# Patient Record
Sex: FEMALE | Race: WHITE | URBAN - METROPOLITAN AREA
[De-identification: names, ages, dates, MRNs, and addresses within clinical notes are randomized per-mention and may not be internally consistent; named-entity substitution may affect disease eponyms.]

---

## 2022-12-09 ENCOUNTER — APPOINTMENT (OUTPATIENT)
Dept: LAB | Age: 47
End: 2022-12-09

## 2022-12-09 ENCOUNTER — APPOINTMENT (OUTPATIENT)
Dept: URGENT CARE | Age: 47
End: 2022-12-09

## 2022-12-09 DIAGNOSIS — Z00.00 PHYSICAL EXAM: ICD-10-CM

## 2022-12-11 LAB
GAMMA INTERFERON BACKGROUND BLD IA-ACNC: 0.03 IU/ML
M TB IFN-G BLD-IMP: NEGATIVE
M TB IFN-G CD4+ BCKGRND COR BLD-ACNC: 0 IU/ML
M TB IFN-G CD4+ BCKGRND COR BLD-ACNC: 0 IU/ML
MITOGEN IGNF BCKGRD COR BLD-ACNC: >10 IU/ML

## 2023-05-11 ENCOUNTER — APPOINTMENT (OUTPATIENT)
Dept: LAB | Facility: CLINIC | Age: 48
End: 2023-05-11

## 2023-05-11 DIAGNOSIS — Z02.1 PRE-EMPLOYMENT HEALTH SCREENING EXAMINATION: ICD-10-CM

## 2023-05-11 LAB
MEV IGG SER QL IA: NORMAL
MUV IGG SER QL IA: NORMAL
RUBV IGG SERPL IA-ACNC: 16.4 IU/ML
VZV IGG SER QL IA: NORMAL

## 2023-05-14 LAB
GAMMA INTERFERON BACKGROUND BLD IA-ACNC: 0.03 IU/ML
M TB IFN-G BLD-IMP: NEGATIVE
M TB IFN-G CD4+ BCKGRND COR BLD-ACNC: -0.01 IU/ML
M TB IFN-G CD4+ BCKGRND COR BLD-ACNC: -0.02 IU/ML
MITOGEN IGNF BCKGRD COR BLD-ACNC: >10 IU/ML

## 2023-09-28 ENCOUNTER — OFFICE VISIT (OUTPATIENT)
Dept: BARIATRICS | Facility: CLINIC | Age: 48
End: 2023-09-28
Payer: COMMERCIAL

## 2023-09-28 VITALS
BODY MASS INDEX: 45.99 KG/M2 | HEIGHT: 67 IN | DIASTOLIC BLOOD PRESSURE: 98 MMHG | SYSTOLIC BLOOD PRESSURE: 164 MMHG | WEIGHT: 293 LBS | HEART RATE: 102 BPM

## 2023-09-28 DIAGNOSIS — Z13.1 SCREENING FOR DIABETES MELLITUS: ICD-10-CM

## 2023-09-28 DIAGNOSIS — E55.9 VITAMIN D DEFICIENCY: ICD-10-CM

## 2023-09-28 DIAGNOSIS — E66.01 CLASS 3 SEVERE OBESITY DUE TO EXCESS CALORIES WITH SERIOUS COMORBIDITY AND BODY MASS INDEX (BMI) OF 45.0 TO 49.9 IN ADULT (HCC): Primary | ICD-10-CM

## 2023-09-28 DIAGNOSIS — Z13.220 SCREENING CHOLESTEROL LEVEL: ICD-10-CM

## 2023-09-28 PROBLEM — E66.813 CLASS 3 SEVERE OBESITY DUE TO EXCESS CALORIES WITH SERIOUS COMORBIDITY AND BODY MASS INDEX (BMI) OF 45.0 TO 49.9 IN ADULT (HCC): Status: ACTIVE | Noted: 2023-09-28

## 2023-09-28 PROCEDURE — 99204 OFFICE O/P NEW MOD 45 MIN: CPT | Performed by: NURSE PRACTITIONER

## 2023-09-28 NOTE — ASSESSMENT & PLAN NOTE
- Discussed options of HealthyCORE-Intensive Lifestyle Intervention Program, Very Low Calorie Diet-VLCD, Conservative Program, Antony-En-Y Gastric Bypass and Vertical Sleeve Gastrectomy and the role of weight loss medications. Patient is not interested in bariatric surgery. - Explained the importance of making lifestyle changes first before starting anti-obesity medications. - Patient should demonstrate lifestyle changes first before anti-obesity medication initiated. - Patient is interested in pursuing HealthyCORE-Intensive Lifestyle Intervention Program and follow up visits with medical weight management provider. - Initial weight loss goal of 5-10% weight loss for improved health  - Weight loss can improve patient's co-morbid conditions and/or prevent weight-related complications. - Lab work ordered today including CBC, CMP, thyroid, lipids, A1c, fasting insulin, vitamin D  - patient lifestyle habits were reviewed and barriers to weight loss were identified today. Patient will be participating in healthy core program to get a solid foundation for healthy lifestyle. Patient will work with dietitian to better balance her calories throughout the day and to work on portion control. Patient was encouraged to get a more solid exercise plan in place included dedicated 30 minutes of activity at least 3 days a week. Medications were discussed but patient would like to avoid medication at this time    Goals:  Do not skip meals. Food log via neeta - options include  www. [email or web address].[email or web address], sparkpeople. com, [email or web address] [email or web address], SideStep. com, baritastic  No sugary beverages. At least 64oz of water daily. Increase physical activity by 10 minutes daily.  Gradually increase physical activity to a goal of 5 days per week for 30 minutes of MODERATE intensity PLUS 2 days per week of FULL BODY resistance training

## 2023-09-28 NOTE — PROGRESS NOTES
Assessment/Plan:    Class 3 severe obesity due to excess calories with serious comorbidity and body mass index (BMI) of 45.0 to 49.9 in adult [facility])  - Discussed options of HealthyCORE-Intensive Lifestyle Intervention Program, Very Low Calorie Diet-VLCD, Conservative Program, Antony-En-Y Gastric Bypass and Vertical Sleeve Gastrectomy and the role of weight loss medications. Patient is not interested in bariatric surgery. - Explained the importance of making lifestyle changes first before starting anti-obesity medications. - Patient should demonstrate lifestyle changes first before anti-obesity medication initiated. - Patient is interested in pursuing HealthyCORE-Intensive Lifestyle Intervention Program and follow up visits with medical weight management provider. - Initial weight loss goal of 5-10% weight loss for improved health  - Weight loss can improve patient's co-morbid conditions and/or prevent weight-related complications. - Lab work ordered today including CBC, CMP, thyroid, lipids, A1c, fasting insulin, vitamin D  - patient lifestyle habits were reviewed and barriers to weight loss were identified today. Patient will be participating in healthy core program to get a solid foundation for healthy lifestyle. Patient will work with dietitian to better balance her calories throughout the day and to work on portion control. Patient was encouraged to get a more solid exercise plan in place included dedicated 30 minutes of activity at least 3 days a week. Medications were discussed but patient would like to avoid medication at this time    Goals:  Do not skip meals. Food log via neeta - options include  www. [email or web address] [email or web address], sparkpeople. com, HZOit.com, Per[employer] Technologiesking. com, baritastic  No sugary beverages. At least 64oz of water daily. Increase physical activity by 10 minutes daily.  Gradually increase physical activity to a goal of 5 days per week for 30 minutes of MODERATE intensity PLUS 2 days per week of FULL BODY resistance training         Rachael Jason was seen today for consult. Diagnoses and all orders for this visit:    Class 3 severe obesity due to excess calories with serious comorbidity and body mass index (BMI) of 45.0 to 49.9 in adult (HCC)  -     CBC and differential; Future  -     Comprehensive metabolic panel; Future  -     TSH, 3rd generation with Free T4 reflex; Future  -     Vitamin D 25 hydroxy; Future  -     Lipid panel; Future  -     Insulin, fasting; Future  -     HEMOGLOBIN A1C W/ EAG ESTIMATION; Future    Vitamin D deficiency  -     Vitamin D 25 hydroxy; Future    Screening for diabetes mellitus  -     Insulin, fasting; Future  -     HEMOGLOBIN A1C W/ EAG ESTIMATION; Future    Screening cholesterol level  -     Lipid panel; Future           Total time spent reviewing chart, interviewing patient, examining patient, discussing plan, answering all questions, and documentin min, with >50% face-to-face time spent counseling patient on nonsurgical interventions for the treatment of excess weight. Discussed in detail nonsurgical options including intensive lifestyle intervention program, very low-calorie diet program and conservative program.  Discussed the role of weight loss medications. Counseled patient on diet behavior and exercise modification for weight loss. Follow up in approximately 2 months with Non-Surgical Physician/Advanced Practitioner. Subjective:   Chief Complaint   Patient presents with   • Consult     Pt is here today for MWM consult. Patient ID: Genevieve Ornelas  is a 49 y.o. female with excess weight/obesity here to pursue weight management. Previous notes and records have been reviewed. History reviewed. No pertinent past medical history.   Past Surgical History:   Procedure Laterality Date   • HERNIA REPAIR     • KNEE ARTHROSCOPY W/ ACL RECONSTRUCTION Left    • LITHOTRIPSY     • WISDOM TOOTH EXTRACTION Bilateral        HPI:  Wt Readings from Last 20 Encounters:   23 133 kg (294 lb)       Patient presents today to medical weight management office for consult. Patient has always been an active person participating in many sports and dancing well into her 35s. She became less active when she tore her ACL and has now been struggling to keep her weight under control. She has tried weight watchers in the past but felt that if she was not losing weight she was judged in school did, which did not motivate her to continue with the program.  Patient has not had lab work in quite a few years. Patient does have a history of kidney stones. 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  Patient is not interested in weight loss surgery and is not sure she wants to start weight loss medications at this time. Obesity/Excess Weight:  Severity: Severe  Onset:  15+ years    Modifiers: Diet and Exercise and Commercial Weight Loss Programs-ie.  Weight Watchers, Mar Marten, Nutrisystem, etc.  Contributing factors: Poor Food Choices, Insufficient Physical Activity, Stress/Emotional Eating, Lack of knowledge of appropriate lifestyle changes and Insufficient time to make appropriate lifestyle changes  Associated symptoms: fatigue, increased joint pain, decreased exercise capacity, body image issues, decreased self esteem, increased shortness of breath, decreased mobility, inability to do certain activities and clothes do not fit    Diet recall:  B: skips  S: (10 am) granola bar  L: (1pm) sandwich (cold cuts)   S: pretzels OR fruit  D: (7-9pm) steak/chicken with vegetables some starches (rice)  S: no    Hydration: water - 6 glasses , unsweetened iced tea, recently cut out soda  Alcohol: occasionally  Smoking: no  Exercise: walking  Occupation: phlebotomist  Sleep: well  STOP ban/8    Current weight: 294 lbs  Goal weight: 170 lbs    Colonoscopy: due  Mammogram: 2022    The following portions of the patient's history were reviewed and updated as appropriate: allergies, current medications, past family history, past medical history, past social history, past surgical history, and problem list.    Family History   Problem Relation Age of Onset   • Cancer Mother    • Breast cancer Mother    • Lung cancer Mother    • COPD Father    • Multiple sclerosis Sister    • Hemochromatosis Brother         Review of Systems   Constitutional: Negative for fatigue. HENT: Negative for sore throat. Respiratory: Negative for cough and shortness of breath. Cardiovascular: Negative for chest pain, palpitations and leg swelling. Gastrointestinal: Negative for abdominal pain, constipation, diarrhea and nausea. Genitourinary: Negative for dysuria. Musculoskeletal: Positive for arthralgias. Negative for back pain. Skin: Negative for rash. Neurological: Positive for headaches (hormonal). Psychiatric/Behavioral: Negative for dysphoric mood. The patient is not nervous/anxious. Objective:  /98 (BP Location: Left arm, Patient Position: Sitting, Cuff Size: Large)   Pulse 102   Ht 5' 7.13" (1.705 m)   Wt 133 kg (294 lb)   LMP 09/18/2023 (Exact Date)   BMI 45.87 kg/m²     Physical Exam  Vitals and nursing note reviewed. Constitutional:       Appearance: Normal appearance. She is obese. HENT:      Head: Normocephalic. Pulmonary:      Effort: Pulmonary effort is normal.   Neurological:      General: No focal deficit present. Mental Status: She is alert and oriented to person, place, and time. Psychiatric:         Mood and Affect: Mood normal.         Behavior: Behavior normal.         Thought Content: Thought content normal.         Judgment: Judgment normal.                Labs and Imaging  Recent labs and imaging have been personally reviewed.   No results found for: "WBC", "HGB", "HCT", "MCV", "PLT"  No results found for: "NA", "SODIUM", "K", "CL", "CO2", "ANIONGAP", "AGAP", "BUN", "CREATININE", "GLUC", "GLUF", "CALCIUM", "AST", "ALT", "ALKPHOS", "PROT", "TP", "BILITOT", "TBILI", "EGFR"  No results found for: "HGBA1C"  No results found for: "[id number]", "TSH"  No results found for: "CHOLESTEROL"  No results found for: "HDL"  No results found for: "TRIG"  No results found for: "LDLCALC"

## 2023-10-10 ENCOUNTER — APPOINTMENT (OUTPATIENT)
Dept: LAB | Facility: CLINIC | Age: 48
End: 2023-10-10
Payer: COMMERCIAL

## 2023-10-10 DIAGNOSIS — Z13.220 SCREENING CHOLESTEROL LEVEL: ICD-10-CM

## 2023-10-10 DIAGNOSIS — E66.01 CLASS 3 SEVERE OBESITY DUE TO EXCESS CALORIES WITH SERIOUS COMORBIDITY AND BODY MASS INDEX (BMI) OF 45.0 TO 49.9 IN ADULT (HCC): ICD-10-CM

## 2023-10-10 DIAGNOSIS — Z13.1 SCREENING FOR DIABETES MELLITUS: ICD-10-CM

## 2023-10-10 DIAGNOSIS — E55.9 VITAMIN D DEFICIENCY: ICD-10-CM

## 2023-10-10 LAB
25(OH)D3 SERPL-MCNC: 16.5 NG/ML (ref 30–100)
ALBUMIN SERPL BCP-MCNC: 4.4 G/DL (ref 3.5–5)
ALP SERPL-CCNC: 79 U/L (ref 34–104)
ALT SERPL W P-5'-P-CCNC: 68 U/L (ref 7–52)
ANION GAP SERPL CALCULATED.3IONS-SCNC: 9 MMOL/L
AST SERPL W P-5'-P-CCNC: 60 U/L (ref 13–39)
BASOPHILS # BLD AUTO: 0.03 THOUSANDS/ÂΜL (ref 0–0.1)
BASOPHILS NFR BLD AUTO: 0 % (ref 0–1)
BILIRUB SERPL-MCNC: 1.47 MG/DL (ref 0.2–1)
BUN SERPL-MCNC: 13 MG/DL (ref 5–25)
CALCIUM SERPL-MCNC: 9.4 MG/DL (ref 8.4–10.2)
CHLORIDE SERPL-SCNC: 100 MMOL/L (ref 96–108)
CHOLEST SERPL-MCNC: 202 MG/DL
CO2 SERPL-SCNC: 28 MMOL/L (ref 21–32)
CREAT SERPL-MCNC: 0.62 MG/DL (ref 0.6–1.3)
EOSINOPHIL # BLD AUTO: 0.15 THOUSAND/ÂΜL (ref 0–0.61)
EOSINOPHIL NFR BLD AUTO: 2 % (ref 0–6)
ERYTHROCYTE [DISTWIDTH] IN BLOOD BY AUTOMATED COUNT: 13.1 % (ref 11.6–15.1)
EST. AVERAGE GLUCOSE BLD GHB EST-MCNC: 131 MG/DL
GFR SERPL CREATININE-BSD FRML MDRD: 106 ML/MIN/1.73SQ M
GLUCOSE P FAST SERPL-MCNC: 87 MG/DL (ref 65–99)
HBA1C MFR BLD: 6.2 %
HCT VFR BLD AUTO: 44.1 % (ref 34.8–46.1)
HDLC SERPL-MCNC: 59 MG/DL
HGB BLD-MCNC: 14.2 G/DL (ref 11.5–15.4)
IMM GRANULOCYTES # BLD AUTO: 0.03 THOUSAND/UL (ref 0–0.2)
IMM GRANULOCYTES NFR BLD AUTO: 0 % (ref 0–2)
INSULIN SERPL-ACNC: 10.09 UIU/ML (ref 1.9–23)
LDLC SERPL CALC-MCNC: 119 MG/DL (ref 0–100)
LYMPHOCYTES # BLD AUTO: 1.89 THOUSANDS/ÂΜL (ref 0.6–4.47)
LYMPHOCYTES NFR BLD AUTO: 21 % (ref 14–44)
MCH RBC QN AUTO: 28.5 PG (ref 26.8–34.3)
MCHC RBC AUTO-ENTMCNC: 32.2 G/DL (ref 31.4–37.4)
MCV RBC AUTO: 88 FL (ref 82–98)
MONOCYTES # BLD AUTO: 0.55 THOUSAND/ÂΜL (ref 0.17–1.22)
MONOCYTES NFR BLD AUTO: 6 % (ref 4–12)
NEUTROPHILS # BLD AUTO: 6.33 THOUSANDS/ÂΜL (ref 1.85–7.62)
NEUTS SEG NFR BLD AUTO: 71 % (ref 43–75)
NONHDLC SERPL-MCNC: 143 MG/DL
NRBC BLD AUTO-RTO: 0 /100 WBCS
PLATELET # BLD AUTO: 245 THOUSANDS/UL (ref 149–390)
PMV BLD AUTO: 11 FL (ref 8.9–12.7)
POTASSIUM SERPL-SCNC: 4.1 MMOL/L (ref 3.5–5.3)
PROT SERPL-MCNC: 7.7 G/DL (ref 6.4–8.4)
RBC # BLD AUTO: 4.99 MILLION/UL (ref 3.81–5.12)
SODIUM SERPL-SCNC: 137 MMOL/L (ref 135–147)
TRIGL SERPL-MCNC: 120 MG/DL
TSH SERPL DL<=0.05 MIU/L-ACNC: 2.42 UIU/ML (ref 0.45–4.5)
WBC # BLD AUTO: 8.98 THOUSAND/UL (ref 4.31–10.16)

## 2023-10-10 PROCEDURE — 84443 ASSAY THYROID STIM HORMONE: CPT

## 2023-10-10 PROCEDURE — 80053 COMPREHEN METABOLIC PANEL: CPT

## 2023-10-10 PROCEDURE — 82306 VITAMIN D 25 HYDROXY: CPT

## 2023-10-10 PROCEDURE — 80061 LIPID PANEL: CPT

## 2023-10-10 PROCEDURE — 85025 COMPLETE CBC W/AUTO DIFF WBC: CPT

## 2023-10-10 PROCEDURE — 83036 HEMOGLOBIN GLYCOSYLATED A1C: CPT

## 2023-10-10 PROCEDURE — 36415 COLL VENOUS BLD VENIPUNCTURE: CPT

## 2023-10-10 PROCEDURE — 83525 ASSAY OF INSULIN: CPT

## 2023-10-12 DIAGNOSIS — E55.9 VITAMIN D DEFICIENCY: ICD-10-CM

## 2023-10-12 DIAGNOSIS — R74.8 ELEVATED LIVER ENZYMES: Primary | ICD-10-CM

## 2023-10-12 RX ORDER — ERGOCALCIFEROL 1.25 MG/1
50000 CAPSULE ORAL WEEKLY
Qty: 12 CAPSULE | Refills: 0 | Status: SHIPPED | OUTPATIENT
Start: 2023-10-12 | End: 2024-01-10

## 2023-10-12 NOTE — PROGRESS NOTES
Weight Management Medical Nutrition Assessment  Ap De Los Santos is here today for Healthy Core initial visit. Current weight 292.9#. Patient has struggled with weight since she tore her ACL. She was an active person, playing sports and dancing well into her 35s. Per dietary recall, suspect excess calories from large portions, sugar sweetened beverages and rebound hunger. Patient will often skip breakfast, have carbohydrate rich snack, a sandwich at lunch, grazing, and a large dinner before bed. Discussed benefits of interval eating, adequate protein intake, and mindful eating. Goal was set today to reduce soda intake to every other day. Will review at 2 week f/u to further expand on goal. Low-calorie meal plan reviewed. Discussed benefits of consistent carbohydrates and pairing carbohydrate-rich foods with fat, fiber, and protein. Encouraged patient to increase daily activity (treadmill) by 10 mins with eventual goal of 30 mins 5x/week. Goal was set today to incorporate treadmill 3x/week for 10-15 mins. Completed a body composition using SECA scale and will review results with patient at Month 1 f/u.      Likes: eggs, nuts, peanut butter, cheese, Greek yogurt, cottage cheese, tuna, hummus     Patient seen by Medical Provider in past 6 months:  yes  Requested to schedule appointment with Medical Provider: No    Anthropometric Measurements  Start Weight (#): 292.9# (10/18/23)  Current Weight (#): n/a  TBW % Change from start weight: n/a  Ideal Body Weight (#): 136# (67.13")  Goal Weight (#): ST-10% LT#    Weight Loss History  Previous weight loss attempts: Commercial Programs ([employer], Marshall Russell, etc.)  Exercise, Self Created Diets (Portion Control, Healthy Food Choices, etc.)    Food and Nutrition Related History  Wake up: 5-6 am    Bed Time: 10-11 pm   Sleep quality: well    Dietary Recall  Breakfast: --   Snack (10 am): granola bar Or fruit   Lunch (1 pm): deli meat sandwich (wheat bread or bagel w/ singh, turkey, lettuce, cheese)   Snack: pretzels Or fruit  Dinner (7-9 pm): steak, chicken, turkey, white fish, shrimp, occasionally pork/veggie/carb  Snack: --    Beverages: water, unsweetened iced tea, soda (1x/day), alcohol occasionally   Volume of beverage intake: 100 oz water    Weekends: Same  Cravings: pretzels or chocolate   Trouble area of day: 2 pm     Frequency of Eating out: 1x/week   Food restrictions: none  Cooking: self  Food Shopping: self     Occupation: phlebotomist    Physical Activity  Activity: No formal routine (enjoys running but not able to do this currently) [has treadmill]   Frequency:rarely  Physical limitations/barriers to exercise: previous ACL injury    Estimated Needs  Energy  SECA: BMR: 2,104 X 1.4 -1,000 = 2,946 kcal     Bear Shyla Energy Needs (needs at 294#)  BMR: 1,998 kcal  Maintenance calories (sedentary): 2,398 kcal  1-2#/week loss (sedentary): 1,398-1,898 kcal  1-2#/week loss (light activity): 1,748-2,248 kcal    Protein: 74-92 grams (1.2-1.5g/kg IBW)  Fluid: 72 ounces (35mL/kg IBW)    Nutrition Diagnosis  Yes;     Overweight/obesity related to Excess energy intake as evidenced by BMI more than normative standard for age and sex (obesity-grade III 36+)     Nutrition Intervention    Nutrition Prescription  Calories: 1,500-1,700 kcal  Protein: 74-92 g  Fluid: 72+ ounces    Meal Plan (Donis/Pro)  Breakfast: 250-300/15-30  Snack: 150-200/0-5+  Lunch: 400/30-35  Snack: 150-200/0-5+  Dinner: 500-600/35  Snack: --    Nutrition Education  Healthy Core Manual  Calorie controlled menu  Lean protein food choices  Healthy snack options  Food journaling tips    Nutrition Counseling  Strategies: meal planning, portion sizes, healthy snack choices, hydration, fiber intake, protein intake, exercise, food logging    Monitoring and Evaluation:    Evaluation criteria  Energy Intake  Meet protein needs  Maintain adequate hydration  Monitor weekly weight  Meal planning/preparation  Food journal Decreased portions at mealtimes and snacks  Physical activity     Barriers to learning:none  Readiness to change: Preparation:  (Getting ready to change)   Comprehension: very good  Expected Compliance: good

## 2023-10-18 ENCOUNTER — OFFICE VISIT (OUTPATIENT)
Dept: BARIATRICS | Facility: CLINIC | Age: 48
End: 2023-10-18
Payer: COMMERCIAL

## 2023-10-18 VITALS — WEIGHT: 292.9 LBS | HEIGHT: 67 IN | BODY MASS INDEX: 45.97 KG/M2

## 2023-10-18 DIAGNOSIS — E66.01 CLASS 3 SEVERE OBESITY DUE TO EXCESS CALORIES WITH BODY MASS INDEX (BMI) OF 45.0 TO 49.9 IN ADULT, UNSPECIFIED WHETHER SERIOUS COMORBIDITY PRESENT (HCC): Primary | ICD-10-CM

## 2023-10-18 PROCEDURE — 97802 MEDICAL NUTRITION INDIV IN: CPT

## 2023-10-18 PROCEDURE — RECHECK

## 2023-11-09 ENCOUNTER — PATIENT MESSAGE (OUTPATIENT)
Dept: BARIATRICS | Facility: CLINIC | Age: 48
End: 2023-11-09

## 2023-11-09 ENCOUNTER — PATIENT OUTREACH (OUTPATIENT)
Dept: BARIATRICS | Facility: CLINIC | Age: 48
End: 2023-11-09

## 2023-11-09 NOTE — PROGRESS NOTES
Contacted patient with following email. Patient signed up for Healthy Core program, attended no classes, and cancelled 2 month 1 f/u appts. Will await patient reply. Lui Lawson,  I am reaching out about the [employer]. Currently, you have four classes and 1 follow up visit from your Healthy Core Month 1 payment. It appears you cancelled your month 1 follow up with myself and Evon via reminder system. If Healthy Core classes do not work for your schedule, we can discuss other avenues of support. As an employee, you are provided 12 30-min visits for $15 which can be scheduled at any date or time, unlike the classes. Please let me know your thoughts and how we can help with your weight loss goals.    Have a nice day,

## 2024-06-09 ENCOUNTER — OFFICE VISIT (OUTPATIENT)
Dept: URGENT CARE | Age: 49
End: 2024-06-09
Payer: COMMERCIAL

## 2024-06-09 VITALS
TEMPERATURE: 97.5 F | SYSTOLIC BLOOD PRESSURE: 138 MMHG | DIASTOLIC BLOOD PRESSURE: 90 MMHG | HEART RATE: 97 BPM | OXYGEN SATURATION: 98 % | RESPIRATION RATE: 18 BRPM

## 2024-06-09 DIAGNOSIS — H10.31 ACUTE CONJUNCTIVITIS OF RIGHT EYE, UNSPECIFIED ACUTE CONJUNCTIVITIS TYPE: Primary | ICD-10-CM

## 2024-06-09 PROCEDURE — G0382 LEV 3 HOSP TYPE B ED VISIT: HCPCS | Performed by: PHYSICIAN ASSISTANT

## 2024-06-09 PROCEDURE — S9083 URGENT CARE CENTER GLOBAL: HCPCS | Performed by: PHYSICIAN ASSISTANT

## 2024-06-09 RX ORDER — OFLOXACIN 3 MG/ML
1 SOLUTION/ DROPS OPHTHALMIC 4 TIMES DAILY
Qty: 5 ML | Refills: 0 | Status: SHIPPED | OUTPATIENT
Start: 2024-06-09 | End: 2024-06-09

## 2024-06-09 RX ORDER — OFLOXACIN 3 MG/ML
1 SOLUTION/ DROPS OPHTHALMIC 4 TIMES DAILY
Qty: 5 ML | Refills: 0 | Status: SHIPPED | OUTPATIENT
Start: 2024-06-09

## 2024-06-09 NOTE — PROGRESS NOTES
[facility] Now        NAME: Lulu Conklin is a 48 y.o. female  : 1975    MRN: [id number]  DATE: 2024  TIME: 1:28 PM    Assessment and Plan   Acute conjunctivitis of right eye, unspecified acute conjunctivitis type [H10.31]  1. Acute conjunctivitis of right eye, unspecified acute conjunctivitis type  ofloxacin (OCUFLOX) 0.3 % ophthalmic solution    DISCONTINUED: ofloxacin (OCUFLOX) 0.3 % ophthalmic solution          Advised patient to not put her contacts back until the eye redness has completely resolved.  She remove the contact today.    The patient verbalized understanding of exam findings and treatment plan.   We engaged in the shared decision-making process and treatment options were   discussed at length with the patient.  All questions, concerns and  complaints were answered and addressed to the patient's satisfaction.    Patient Instructions   There are no Patient Instructions on file for this visit.    Follow up with PCP in 3-5 days.  Proceed to  ER if symptoms worsen.    If tests are performed, our office will contact you with results only if   changes need to made to the care plan discussed with you at the visit.   [facility]     Chief Complaint     Chief Complaint   Patient presents with   • Conjunctivitis     Right eye redness started this morning. Itching and discharge not present.          History of Present Illness       HPI  Pt reports red eye right eye starting this morning. No discharge. No blurred vision. Just itching earlier this morning but not now. No fever chills or URI symptoms. She does wear contacts.    Review of Systems   Review of Systems  All other related systems reviewed and are negative except as noted in HPI    Current Medications       Current Outpatient Medications:   •  Multiple Vitamin (MULTIVITAMIN ADULT PO), Multi Vitamin/Minerals TABS   Refills: 0     Active, Disp: , Rfl:   •  ofloxacin (OCUFLOX) 0.3 %  ophthalmic solution, Administer 1 drop to the right eye 4 (four) times a day, Disp: 5 mL, Rfl: 0  •  ergocalciferol (VITAMIN D2) 50,000 units, Take 1 capsule (50,000 Units total) by mouth once a week, Disp: 12 capsule, Rfl: 0    Current Allergies     Allergies as of 06/09/2024 - Reviewed 06/09/2024   Allergen Reaction Noted   • Penicillins Other (See Comments) 03/28/1991            The following portions of the patient's history were reviewed and updated as appropriate: allergies, current medications, past family history, past medical history, past social history, past surgical history and problem list.     No past medical history on file.    Past Surgical History:   Procedure Laterality Date   • HERNIA REPAIR  1979   • KNEE ARTHROSCOPY W/ ACL RECONSTRUCTION Left 2007   • LITHOTRIPSY  2006   • WISDOM TOOTH EXTRACTION Bilateral 1991       Family History   Problem Relation Age of Onset   • Cancer Mother    • Breast cancer Mother    • Lung cancer Mother    • COPD Father    • Multiple sclerosis Sister    • Hemochromatosis Brother          Medications have been verified.        Objective   /90   Pulse 97   Temp 97.5 °F (36.4 °C)   Resp 18   SpO2 98%   No LMP recorded.       Physical Exam     Physical Exam  Constitutional:       General: She is not in acute distress.     Appearance: She is well-developed.   HENT:      Head: Normocephalic and atraumatic.   Eyes:      General: No scleral icterus.        Right eye: No discharge.         Left eye: No discharge.      Extraocular Movements: Extraocular movements intact.      Pupils: Pupils are equal, round, and reactive to light.      Comments: Conjunctival injection right eye   Neck:      Trachea: No tracheal deviation.   Pulmonary:      Effort: Pulmonary effort is normal. No respiratory distress.      Breath sounds: No stridor.   Musculoskeletal:      Cervical back: Normal range of motion.   Skin:     General: Skin is warm and dry.      Findings: No erythema.  "  Neurological:      Mental Status: She is alert and oriented to person, place, and time.   Psychiatric:         Behavior: Behavior normal.         Ortho Exam        Procedures  No Procedures performed today        Note: Portions of this record may have been created with voice recognition software. Occasional wrong word or \"sound a like\" substitutions may have occurred due to the inherent limitations of voice recognition software. Please read the chart carefully and recognize, using context, where substitutions have occurred.*      "

## 2024-07-18 DIAGNOSIS — Z00.6 ENCOUNTER FOR EXAMINATION FOR NORMAL COMPARISON OR CONTROL IN CLINICAL RESEARCH PROGRAM: ICD-10-CM

## 2024-07-19 ENCOUNTER — APPOINTMENT (OUTPATIENT)
Dept: LAB | Age: 49
End: 2024-07-19

## 2024-07-19 DIAGNOSIS — Z00.6 ENCOUNTER FOR EXAMINATION FOR NORMAL COMPARISON OR CONTROL IN CLINICAL RESEARCH PROGRAM: ICD-10-CM

## 2024-07-19 PROCEDURE — 36415 COLL VENOUS BLD VENIPUNCTURE: CPT

## 2024-07-29 LAB
APOB+LDLR+PCSK9 GENE MUT ANL BLD/T: NOT DETECTED
BRCA1+BRCA2 DEL+DUP + FULL MUT ANL BLD/T: NOT DETECTED
MLH1+MSH2+MSH6+PMS2 GN DEL+DUP+FUL M: NOT DETECTED

## 2025-02-20 ENCOUNTER — APPOINTMENT (OUTPATIENT)
Dept: URGENT CARE | Facility: CLINIC | Age: 50
End: 2025-02-20
Payer: OTHER MISCELLANEOUS

## 2025-02-20 PROCEDURE — G0382 LEV 3 HOSP TYPE B ED VISIT: HCPCS

## 2025-02-20 PROCEDURE — 99283 EMERGENCY DEPT VISIT LOW MDM: CPT

## 2025-02-24 ENCOUNTER — APPOINTMENT (OUTPATIENT)
Dept: URGENT CARE | Facility: CLINIC | Age: 50
End: 2025-02-24
Payer: OTHER MISCELLANEOUS

## 2025-02-24 PROCEDURE — 99213 OFFICE O/P EST LOW 20 MIN: CPT | Performed by: NURSE PRACTITIONER

## 2025-05-22 ENCOUNTER — OFFICE VISIT (OUTPATIENT)
Dept: URGENT CARE | Age: 50
End: 2025-05-22
Payer: COMMERCIAL

## 2025-05-22 VITALS
SYSTOLIC BLOOD PRESSURE: 164 MMHG | OXYGEN SATURATION: 98 % | HEART RATE: 88 BPM | RESPIRATION RATE: 20 BRPM | TEMPERATURE: 98.6 F | DIASTOLIC BLOOD PRESSURE: 91 MMHG

## 2025-05-22 DIAGNOSIS — M79.644 THUMB PAIN, RIGHT: Primary | ICD-10-CM

## 2025-05-22 PROCEDURE — 99213 OFFICE O/P EST LOW 20 MIN: CPT | Performed by: NURSE PRACTITIONER

## 2025-05-22 NOTE — PROGRESS NOTES
[facility] Now        NAME: Lulu Conklin is a 49 y.o. female  : 1975    MRN: [id number]  DATE: May 22, 2025  TIME: 5:51 PM    Assessment and Plan   Thumb pain, right [M79.644]  1. Thumb pain, right  CANCELED: XR hand 3+ vw right            Patient Instructions   Ibuprofen or Aleve as directed  Alternate ice and heat to the area  If no improvement, follow up with orthopedics    Follow up with PCP in 3-5 days.  Proceed to  ER if symptoms worsen.    Chief Complaint     Chief Complaint   Patient presents with    Hand Pain     Started this afternoon. Thumb pain on right hand, has gotten progressively worse today. Hard to grab anything. Pain does not radiate up to wrist.          History of Present Illness       Patient is a 49-year-old female presenting with dorsal right thumb pain that started this afternoon.  Denies known injury.  Increased pain with range of motion.  Denies swelling, erythema, or ecchymosis.  No over-the-counter medications attempted.  She is left-hand dominant.    Hand Pain   Pertinent negatives include no numbness.       Review of Systems   Review of Systems   Constitutional:  Negative for activity change, chills and fever.   Musculoskeletal:  Positive for arthralgias. Negative for joint swelling.   Skin:  Negative for color change and wound.   Neurological:  Negative for weakness and numbness.         Current Medications     Current Medications[1]    Current Allergies     Allergies as of 2025 - Reviewed 2025   Allergen Reaction Noted    Penicillins Other (See Comments) 1991            The following portions of the patient's history were reviewed and updated as appropriate: allergies, current medications, past family history, past medical history, past social history, past surgical history and problem list.     Past Medical History[2]    Past Surgical History[3]    Family History[4]      Medications have been verified.        Objective   /91 (BP Location:  Left arm, Patient Position: Sitting, Cuff Size: Adult)   Pulse 88   Temp 98.6 °F (37 °C) (Tympanic)   Resp 20   SpO2 98%        Physical Exam     Physical Exam  Vitals reviewed.   Constitutional:       General: She is awake. She is not in acute distress.     Appearance: Normal appearance.     Cardiovascular:      Rate and Rhythm: Normal rate.   Pulmonary:      Effort: Pulmonary effort is normal.     Musculoskeletal:      Right wrist: Normal.      Comments: Decreased range of motion with flexion of the first MCP.  Tenderness to palpation over the dorsal aspect of the first metacarpal.  Negative Finkelstein's.     Neurological:      Mental Status: She is alert.     Psychiatric:         Behavior: Behavior is cooperative.                        [1]   Current Outpatient Medications:     ergocalciferol (VITAMIN D2) 50,000 units, Take 1 capsule (50,000 Units total) by mouth once a week, Disp: 12 capsule, Rfl: 0    Multiple Vitamin (MULTIVITAMIN ADULT PO), Multi Vitamin/Minerals TABS   Refills: 0     Active, Disp: , Rfl:     ofloxacin (OCUFLOX) 0.3 % ophthalmic solution, Administer 1 drop to the right eye 4 (four) times a day, Disp: 5 mL, Rfl: 0  [2] No past medical history on file.  [3]   Past Surgical History:  Procedure Laterality Date    HERNIA REPAIR  1979    KNEE ARTHROSCOPY W/ ACL RECONSTRUCTION Left 2007    LITHOTRIPSY  2006    WISDOM TOOTH EXTRACTION Bilateral 1991   [4]   Family History  Problem Relation Name Age of Onset    Cancer Mother      Breast cancer Mother      Lung cancer Mother      COPD Father      Multiple sclerosis Sister      Hemochromatosis Brother

## 2025-05-22 NOTE — PATIENT INSTRUCTIONS
Ibuprofen or Aleve as directed  Alternate ice and heat to the area  If no improvement, follow up with orthopedics    Patient Education     Tendinopathy   About this topic   A tendon is a thick cord that attaches muscle to bone. Tendinopathy is an injury to the tendon. There may be irritation or small tears to the tendon. There are different types of tendinopathy.  Tendinosis ? Is a problem when the tendons break down and likely tear over time. The structure of the tendon, in fact, changes. There is no swelling or inflammation present.  Tenosynovitis ? The thin cover around a tendon, called the tendon sheath, becomes swollen.  Calcific tendinopathy ? Calcium deposits form in the tendon. This is most common in the shoulder or rotator cuff tendons.  This condition may happen in any tendon. It often happens in the hand, shoulder, elbow, or wrist. Some people have problems in their knee or the back of the heel.  What are the causes?   Injury  Using the tendon over and over  Infection  Some drugs  What can make this more likely to happen?   Things that lead to tendinopathy are:  Age ? As people get older, their tendons become less flexible.  Work ? More common with work that uses repeated motions, awkward positions, frequent reaching, and forceful effort.  Sports ? Doing the same moves done over and over like those used in baseball, basketball, bowling, golf, and tennis.  Other things ? Being heavy, certain illnesses like diabetes or rheumatoid arthritis.  What are the main signs?   You may feel:  Pain and soreness along a tendon, most often near a joint. It is worse with added weight, movement, and often happens at night.  Pain is severe or sharp in early tendinopathy and then is more of a dull ache in later stages of tendinopathy.  A grinding feeling when you move the tendon  Lack of strength due to pain  Stiffness or problems moving the tendon. This is often worse in the morning.  A lump on the tendon  Range of motion  may be limited  Muscle wasting or weakness  The skin over the tendon may be swollen, red, and warm as well.  How does the doctor diagnose this health problem?   Your doctor will do an exam and take your history. The doctor may push, pull, feel, and move the sore part. You may need to have tests like:  X-ray to show if there are calcium deposits  CT or MRI  Ultrasound  Lab tests  How does the doctor treat this health problem?   Rest the painful part  Place an ice pack or a bag of frozen peas wrapped in a towel over the painful part. Never put ice right on the skin. Do not leave the ice on more than 10 to 15 minutes at a time.  Compress or wrap the swollen part to help ease pain. The doctor may give you a brace or strap to wear.  Prop the sore part on pillows.  Skip any action that makes the pain worse.  Your doctor may suggest an exercise program to build muscle strength. You may need to see a physical therapist.  Your doctor may use a sling or splint to keep the affected part from moving.  You may be told to do a special kind of massage on the sore part. This is a cross friction massage.  You may be told to do ice therapy over the tendon. This is done by freezing water in a styrofoam or paper cup. Then you rub the area over the sore tendon with the ice for 5 to 10 minutes up to 3 to 5 times a day.  What drugs may be needed?   The doctor may order drugs to:  Help with pain and swelling  Prevent an infection if you had surgery  The doctor may give you a shot of an anti-inflammatory drug called a corticosteroid. This will help with swelling. Talk with your doctor about the risks of this shot.  What problems could happen?   Tendon ruptures or fully breaks apart  Ongoing pain  Problem comes back  Problems doing activities  More weakness or stiffness  What can be done to prevent this health problem?   Take breaks often when doing things that use the same movement over and over.  Stay active and work out to keep your  muscles strong and flexible.  Warm up slowly and stretch before you work out. Use good ways to train, such as slowly adding to how far you run. Do not work out if you are overly tired. Take extra care if working out in cold weather.  Keep a healthy weight. Being heavy puts more stress on your joints. This makes them more likely to be hurt.  Wear braces or straps during activities if you get the same problem over and over.  Last Reviewed Date   2020-10-21  Consumer Information Use and Disclaimer   This generalized information is a limited summary of diagnosis, treatment, and/or medication information. It is not meant to be comprehensive and should be used as a tool to help the user understand and/or assess potential diagnostic and treatment options. It does NOT include all information about conditions, treatments, medications, side effects, or risks that may apply to a specific patient. It is not intended to be medical advice or a substitute for the medical advice, diagnosis, or treatment of a health care provider based on the health care provider's examination and assessment of a patient’s specific and unique circumstances. Patients must speak with a health care provider for complete information about their health, medical questions, and treatment options, including any risks or benefits regarding use of medications. This information does not endorse any treatments or medications as safe, effective, or approved for treating a specific patient. [employer] and its affiliates disclaim any warranty or liability relating to this information or the use thereof. The use of this information is governed by the Terms of Use, available at https://www.[employer].com/en/know/clinical-effectiveness-terms   Copyright   Copyright © 2024 [employer] and its affiliates and/or licensors. All rights reserved.

## 2025-07-16 ENCOUNTER — TELEPHONE (OUTPATIENT)
Age: 50
End: 2025-07-16

## 2025-07-16 NOTE — TELEPHONE ENCOUNTER
Rec'd call from pt requesting to schedule an appt to remove skin tags. As pt is new to derm, I gave her the option to schedule an appt with Derm or to contact the [facility] as they may have sooner appts available. Pt requested number for [id number]. Provided number.

## 2025-07-22 ENCOUNTER — OFFICE VISIT (OUTPATIENT)
Dept: INTERNAL MEDICINE CLINIC | Facility: CLINIC | Age: 50
End: 2025-07-22
Payer: COMMERCIAL

## 2025-07-22 VITALS
OXYGEN SATURATION: 99 % | DIASTOLIC BLOOD PRESSURE: 104 MMHG | TEMPERATURE: 97.7 F | HEART RATE: 93 BPM | HEIGHT: 68 IN | BODY MASS INDEX: 43.19 KG/M2 | WEIGHT: 285 LBS | SYSTOLIC BLOOD PRESSURE: 160 MMHG

## 2025-07-22 DIAGNOSIS — I10 ESSENTIAL HYPERTENSION: ICD-10-CM

## 2025-07-22 DIAGNOSIS — R73.03 PREDIABETES: ICD-10-CM

## 2025-07-22 DIAGNOSIS — E78.2 MIXED HYPERLIPIDEMIA: ICD-10-CM

## 2025-07-22 DIAGNOSIS — Z11.4 SCREENING FOR HIV (HUMAN IMMUNODEFICIENCY VIRUS): ICD-10-CM

## 2025-07-22 DIAGNOSIS — Z23 ENCOUNTER FOR IMMUNIZATION: ICD-10-CM

## 2025-07-22 DIAGNOSIS — Z11.59 NEED FOR HEPATITIS C SCREENING TEST: ICD-10-CM

## 2025-07-22 DIAGNOSIS — Z00.00 WELLNESS EXAMINATION: Primary | ICD-10-CM

## 2025-07-22 DIAGNOSIS — E55.9 VITAMIN D DEFICIENCY: ICD-10-CM

## 2025-07-22 DIAGNOSIS — Z12.11 SCREENING FOR COLON CANCER: ICD-10-CM

## 2025-07-22 PROCEDURE — 99386 PREV VISIT NEW AGE 40-64: CPT | Performed by: INTERNAL MEDICINE

## 2025-07-22 NOTE — PROGRESS NOTES
Name: Lulu Conklin      : 1975      MRN: [id number]  Encounter Provider: Prince Sanches MD  Encounter Date: 2025   Encounter department: [facility]  :  Assessment & Plan  Encounter for immunization         Need for hepatitis C screening test    Orders:  •  Hepatitis C Antibody; Future    Screening for HIV (human immunodeficiency virus)    Orders:  •  HIV 1/2 AG/AB w Reflex [facility] for 2 yr old and above; Future    Wellness examination  Discussed preventative health, cancer screening, immunizations, and safety issues.  Patient had mammogram 2025.  I recommend screening colonoscopy.  No family history of early colon cancer.  Patient had Tdap vaccination 2022.  I recommend yearly flu shot.    I recommend getting the Shingrix shot to help prevent Shingles.  You can get it a pharmacy, and they can administer it there.  It is a two shot series with the second shot needed between 2-6 months after the first shot.  I would not recommend getting the shot before an important or fun event in case you were to have a reaction to the shot like a sore arm or flu-like symptoms.  I make the same recommendation about any shot, as people can have a reaction to any shot.       Prediabetes    Orders:  •  Hemoglobin A1C; Future    Mixed hyperlipidemia    Orders:  •  CBC and differential; Future  •  Comprehensive metabolic panel; Future  •  Lipid Panel with Direct LDL reflex; Future  •  TSH, 3rd generation with Free T4 reflex; Future    Vitamin D deficiency    Orders:  •  Vitamin D 25 hydroxy; Future    Screening for colon cancer    Orders:  •  Ambulatory Referral to Gastroenterology; Future    Essential hypertension  Discussed the possibility of whitecoat hypertension with patient reporting getting good blood pressures at home 117/75 range.  Blood pressure is elevated here, I recommend patient continue to monitor at home, and when she comes back for her next office visit, to bring her  "blood pressure cuff with her so we can check it with her cuff and with the office check.             Depression Screening and Follow-up Plan: Patient was screened for depression during today's encounter. They screened negative with a PHQ-2 score of 0.        History of Present Illness   Patient here to establish care.    Wellness: No smoking cigarettes, patient has routine dental care, tries to eat a healthy diet and exercise      Review of Systems   Constitutional:  Negative for chills, fatigue and fever.   HENT:  Negative for congestion, nosebleeds, postnasal drip, sore throat and trouble swallowing.    Eyes:  Negative for pain.   Respiratory:  Negative for cough, chest tightness, shortness of breath and wheezing.    Cardiovascular:  Negative for chest pain, palpitations and leg swelling.   Gastrointestinal:  Negative for abdominal pain, constipation, diarrhea, nausea and vomiting.   Endocrine: Negative for polydipsia and polyuria.   Genitourinary:  Negative for dysuria, flank pain and hematuria.   Musculoskeletal:  Negative for arthralgias.   Skin:  Negative for rash.   Neurological:  Negative for dizziness, tremors, light-headedness and headaches.   Hematological:  Does not bruise/bleed easily.   Psychiatric/Behavioral:  Negative for confusion and dysphoric mood. The patient is not nervous/anxious.        Objective   BP (!) 160/104   Pulse 93   Temp 97.7 °F (36.5 °C) (Tympanic)   Ht 5' 8\" (1.727 m)   Wt 129 kg (285 lb)   SpO2 99%   BMI 43.33 kg/m²      Physical Exam  Vitals reviewed.   Constitutional:       Appearance: Normal appearance. She is well-developed.   HENT:      Head: Normocephalic and atraumatic.      Right Ear: External ear normal.      Left Ear: External ear normal.      Nose: Nose normal.      Mouth/Throat:      Mouth: Mucous membranes are moist.      Pharynx: Oropharynx is clear.     Eyes:      General: No scleral icterus.     Conjunctiva/sclera: Conjunctivae normal.     Neck:      Thyroid: " No thyromegaly.      Trachea: No tracheal deviation.     Cardiovascular:      Rate and Rhythm: Normal rate and regular rhythm.      Heart sounds: Normal heart sounds. No murmur heard.  Pulmonary:      Effort: No respiratory distress.      Breath sounds: Normal breath sounds. No wheezing or rales.   Abdominal:      General: Bowel sounds are normal.      Palpations: Abdomen is soft. There is no mass.      Tenderness: There is no abdominal tenderness. There is no guarding.     Musculoskeletal:      Cervical back: Normal range of motion and neck supple.      Right lower leg: Edema (trace) present.      Left lower leg: Edema (trace) present.   Lymphadenopathy:      Cervical: No cervical adenopathy.     Skin:     Coloration: Skin is not jaundiced or pale.     Neurological:      General: No focal deficit present.      Mental Status: She is alert and oriented to person, place, and time.     Psychiatric:         Mood and Affect: Mood normal.         Behavior: Behavior normal.         Thought Content: Thought content normal.         Judgment: Judgment normal.

## 2025-07-22 NOTE — PATIENT INSTRUCTIONS
Problem List Items Addressed This Visit          Cardiovascular and Mediastinum    Essential hypertension    Discussed the possibility of whitecoat hypertension with patient reporting getting good blood pressures at home 117/75 range.  Blood pressure is elevated here, I recommend patient continue to monitor at home, and when she comes back for her next office visit, to bring her blood pressure cuff with her so we can check it with her cuff and with the office check.            Other    Wellness examination - Primary    Discussed preventative health, cancer screening, immunizations, and safety issues.  Patient had mammogram January 28, 2025.  I recommend screening colonoscopy.  No family history of early colon cancer.  Patient had Tdap vaccination 11/29/2022.  I recommend yearly flu shot.    I recommend getting the Shingrix shot to help prevent Shingles.  You can get it a pharmacy, and they can administer it there.  It is a two shot series with the second shot needed between 2-6 months after the first shot.  I would not recommend getting the shot before an important or fun event in case you were to have a reaction to the shot like a sore arm or flu-like symptoms.  I make the same recommendation about any shot, as people can have a reaction to any shot.          Other Visit Diagnoses         Encounter for immunization          Need for hepatitis C screening test        Relevant Orders    Hepatitis C Antibody      Screening for HIV (human immunodeficiency virus)        Relevant Orders    HIV 1/2 AG/AB w Reflex [facility] for 2 yr old and above      Prediabetes        Relevant Orders    Hemoglobin A1C      Mixed hyperlipidemia        Relevant Orders    CBC and differential    Comprehensive metabolic panel    Lipid Panel with Direct LDL reflex    TSH, 3rd generation with Free T4 reflex      Vitamin D deficiency        Relevant Orders    Vitamin D 25 hydroxy      Screening for colon cancer        Relevant Orders    Ambulatory  Referral to Gastroenterology

## 2025-07-22 NOTE — ASSESSMENT & PLAN NOTE
Discussed the possibility of whitecoat hypertension with patient reporting getting good blood pressures at home 117/75 range.  Blood pressure is elevated here, I recommend patient continue to monitor at home, and when she comes back for her next office visit, to bring her blood pressure cuff with her so we can check it with her cuff and with the office check.

## 2025-07-22 NOTE — ASSESSMENT & PLAN NOTE
Discussed preventative health, cancer screening, immunizations, and safety issues.  Patient had mammogram January 28, 2025.  I recommend screening colonoscopy.  No family history of early colon cancer.  Patient had Tdap vaccination 11/29/2022.  I recommend yearly flu shot.    I recommend getting the Shingrix shot to help prevent Shingles.  You can get it a pharmacy, and they can administer it there.  It is a two shot series with the second shot needed between 2-6 months after the first shot.  I would not recommend getting the shot before an important or fun event in case you were to have a reaction to the shot like a sore arm or flu-like symptoms.  I make the same recommendation about any shot, as people can have a reaction to any shot.